# Patient Record
(demographics unavailable — no encounter records)

---

## 2024-10-10 NOTE — PHYSICAL EXAM
[No Acute Distress] : no acute distress [Well Nourished] : well nourished [No Edema] : there was no peripheral edema [Normal] : normal rate, regular rhythm, normal S1 and S2 and no murmur heard

## 2024-10-10 NOTE — HEALTH RISK ASSESSMENT
[No] : No [No falls in past year] : Patient reported no falls in the past year [0] : 2) Feeling down, depressed, or hopeless: Not at all (0) [Never] : Never [PHQ-2 Negative - No further assessment needed] : PHQ-2 Negative - No further assessment needed [[id number]] : 0

## 2024-10-10 NOTE — HISTORY OF PRESENT ILLNESS
[FreeTextEntry1] : Pt following up on blood pressure. She checked this morning, her bp was 135/80. Pt wants medication refill.  [de-identified] : Ms. LIUDMILA GRIFFITHS is a 50 year old female here today for refill of phentermine. Says it is has helped and hasn't seen an increase in her BP BPs at home have been 120-130/70-80.

## 2025-07-18 NOTE — HEALTH RISK ASSESSMENT
[No] : No [No falls in past year] : Patient reported no falls in the past year [0] : 2) Feeling down, depressed, or hopeless: Not at all (0) [Never] : Never [Audit-CScore] : 0 [[id number]] : 0

## 2025-07-18 NOTE — HISTORY OF PRESENT ILLNESS
[FreeTextEntry1] : Pt wishes to discuss weight loss program.  [de-identified] : Ms. LIUDMILA GRIFFITHS is a 51 year old female here today for weight management. Has back issues. No PT now. Only working; no exercise.  Tried topiramate and phentermine in the past.  No issues with BP.

## 2025-07-18 NOTE — COUNSELING
[Encouraged to increase physical activity] : Encouraged to increase physical activity [Target Wt Loss Goal ___] : Weight Loss Goals: Target weight loss goal [unfilled] lbs [Weigh Self Weekly] : weigh self weekly [Decrease Portions] : decrease portions [____ min/wk Activity] : [unfilled] min/wk activity [FreeTextEntry2] : Likely overeating.  [FreeTextEntry4] : 20

## 2025-07-18 NOTE — PHYSICAL EXAM
[Normal] : affect was normal and insight and judgment were intact [de-identified] : Overweight female in NAD